# Patient Record
Sex: FEMALE | Race: WHITE
[De-identification: names, ages, dates, MRNs, and addresses within clinical notes are randomized per-mention and may not be internally consistent; named-entity substitution may affect disease eponyms.]

---

## 2020-09-30 ENCOUNTER — DASHBOARD ENCOUNTERS (OUTPATIENT)
Age: 60
End: 2020-09-30

## 2020-09-30 ENCOUNTER — OFFICE VISIT (OUTPATIENT)
Dept: URBAN - METROPOLITAN AREA CLINIC 54 | Facility: CLINIC | Age: 60
End: 2020-09-30
Payer: COMMERCIAL

## 2020-09-30 ENCOUNTER — LAB OUTSIDE AN ENCOUNTER (OUTPATIENT)
Dept: URBAN - METROPOLITAN AREA CLINIC 54 | Facility: CLINIC | Age: 60
End: 2020-09-30

## 2020-09-30 DIAGNOSIS — R74.8 ELEVATED ALKALINE PHOSPHATASE LEVEL: ICD-10-CM

## 2020-09-30 DIAGNOSIS — R19.5 POSITIVE FECAL OCCULT BLOOD TEST: ICD-10-CM

## 2020-09-30 DIAGNOSIS — R10.11 RUQ PAIN: ICD-10-CM

## 2020-09-30 DIAGNOSIS — K83.8 DILATION OF BILIARY TRACT: ICD-10-CM

## 2020-09-30 PROBLEM — 105997008: Status: ACTIVE | Noted: 2020-09-30

## 2020-09-30 PROCEDURE — 82977 ASSAY OF GGT: CPT | Performed by: INTERNAL MEDICINE

## 2020-09-30 PROCEDURE — 1036F TOBACCO NON-USER: CPT | Performed by: INTERNAL MEDICINE

## 2020-09-30 PROCEDURE — 3017F COLORECTAL CA SCREEN DOC REV: CPT | Performed by: INTERNAL MEDICINE

## 2020-09-30 PROCEDURE — 99213 OFFICE O/P EST LOW 20 MIN: CPT | Performed by: INTERNAL MEDICINE

## 2020-09-30 PROCEDURE — G8427 DOCREV CUR MEDS BY ELIG CLIN: HCPCS | Performed by: INTERNAL MEDICINE

## 2020-09-30 PROCEDURE — 43235 EGD DIAGNOSTIC BRUSH WASH: CPT | Performed by: INTERNAL MEDICINE

## 2020-09-30 RX ORDER — ESOMEPRAZOLE MAGNESIUM 40 MG/1
1 CAPSULE CAPSULE, DELAYED RELEASE ORAL ONCE A DAY
Status: ACTIVE | COMMUNITY

## 2020-09-30 RX ORDER — METFORMIN HYDROCHLORIDE 500 MG/1
TABLET, COATED ORAL
Qty: 0 | Refills: 0 | Status: DISCONTINUED | COMMUNITY
Start: 1900-01-01

## 2020-09-30 RX ORDER — LISINOPRIL 40 MG/1
TABLET ORAL
Qty: 0 | Refills: 0 | Status: ACTIVE | COMMUNITY
Start: 1900-01-01

## 2020-09-30 RX ORDER — FAMOTIDINE 20 MG/1
TABLET ORAL
Qty: 0 | Refills: 0 | Status: ACTIVE | COMMUNITY
Start: 1900-01-01

## 2020-09-30 RX ORDER — NORTRIPTYLINE HYDROCHLORIDE 10 MG/1
CAPSULE ORAL
Qty: 0 | Refills: 0 | Status: ACTIVE | COMMUNITY
Start: 1900-01-01

## 2020-09-30 RX ORDER — CETIRIZINE HYDROCHLORIDE 10 MG/1
TABLET, FILM COATED ORAL
Qty: 0 | Refills: 0 | Status: ACTIVE | COMMUNITY
Start: 1900-01-01

## 2020-09-30 RX ORDER — FLUTICASONE PROPIONATE 50 UG/1
SPRAY, METERED NASAL
Qty: 0 | Refills: 0 | Status: ACTIVE | COMMUNITY
Start: 1900-01-01

## 2020-09-30 RX ORDER — GABAPENTIN 600 MG/1
1 TABLET TABLET, FILM COATED ORAL ONCE A DAY
Status: ACTIVE | COMMUNITY

## 2020-09-30 RX ORDER — CITALOPRAM 40 MG/1
TABLET ORAL
Qty: 0 | Refills: 0 | Status: ACTIVE | COMMUNITY
Start: 1900-01-01

## 2020-09-30 RX ORDER — SODIUM, POTASSIUM,MAG SULFATES 17.5-3.13G
354ML SOLUTION, RECONSTITUTED, ORAL ORAL
Qty: 354 MILLILITER | Refills: 0 | OUTPATIENT

## 2020-09-30 NOTE — HPI-TODAY'S VISIT:
This is a 60-year-old lady with a history of hypertension, diabetes, status post cholecystectomy, and a history of tubulovillous polyp presents as a follow-up to clinic for a positive fecal occult blood test. Patient reports that for the last 3 to 4 months she has had some right-sided periumbilical discomfort that is tender to palpation.  It does not appear to be positional or associated with food or bowel movements.  Is a constant discomfort.  Her bowel movements are once every 2 days of formed stool with no visible blood or black tarry stool.  Her primary care doctor ordered a fecal occult blood test that was positive recently.  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  She had a recent CT abdomen with contrast that showed a fatty liver normal pancreas and a surgically absent gallbladder.  However the bile duct was noted to be dilated.  She had labs that showed an elevated alk phos of 146 with normal enzymes and bilirubin.  Last CBC showed WBC of 10, hemoglobin 15.2, platelets at 236.  BUN of 26 and creatinine is 0.75. She denies any alcohol, tobacco, or drug use.  She denies any family history of colon cancer.

## 2020-10-05 ENCOUNTER — TELEPHONE ENCOUNTER (OUTPATIENT)
Dept: URBAN - METROPOLITAN AREA CLINIC 54 | Facility: CLINIC | Age: 60
End: 2020-10-05

## 2020-10-08 ENCOUNTER — CLAIMS CREATED FROM THE CLAIM WINDOW (OUTPATIENT)
Dept: URBAN - METROPOLITAN AREA CLINIC 4 | Facility: CLINIC | Age: 60
End: 2020-10-08
Payer: COMMERCIAL

## 2020-10-08 ENCOUNTER — OFFICE VISIT (OUTPATIENT)
Dept: URBAN - METROPOLITAN AREA SURGERY CENTER 14 | Facility: SURGERY CENTER | Age: 60
End: 2020-10-08
Payer: COMMERCIAL

## 2020-10-08 DIAGNOSIS — K31.7 BENIGN GASTRIC POLYP: ICD-10-CM

## 2020-10-08 DIAGNOSIS — D12.4 ADENOMA OF DESCENDING COLON: ICD-10-CM

## 2020-10-08 DIAGNOSIS — K62.5 ANAL BLEEDING: ICD-10-CM

## 2020-10-08 DIAGNOSIS — D12.4 BENIGN NEOPLASM OF DESCENDING COLON: ICD-10-CM

## 2020-10-08 DIAGNOSIS — R10.84 ABDOMINAL CRAMPING, GENERALIZED: ICD-10-CM

## 2020-10-08 DIAGNOSIS — K31.7 POLYP OF STOMACH AND DUODENUM: ICD-10-CM

## 2020-10-08 DIAGNOSIS — T47.8X5A ADVERSE EFFECT OF OTHER AGENTS PRIMARILY AFFECTING GASTROINTESTINAL SYSTEM, INITIAL ENCOUNTER: ICD-10-CM

## 2020-10-08 PROCEDURE — 45385 COLONOSCOPY W/LESION REMOVAL: CPT | Performed by: INTERNAL MEDICINE

## 2020-10-08 PROCEDURE — 88312 SPECIAL STAINS GROUP 1: CPT | Performed by: PATHOLOGY

## 2020-10-08 PROCEDURE — G9937 DIG OR SURV COLSCO: HCPCS | Performed by: INTERNAL MEDICINE

## 2020-10-08 PROCEDURE — 43239 EGD BIOPSY SINGLE/MULTIPLE: CPT | Performed by: INTERNAL MEDICINE

## 2020-10-08 PROCEDURE — G8907 PT DOC NO EVENTS ON DISCHARG: HCPCS | Performed by: INTERNAL MEDICINE

## 2020-10-08 PROCEDURE — 88305 TISSUE EXAM BY PATHOLOGIST: CPT | Performed by: PATHOLOGY

## 2020-10-08 RX ORDER — NORTRIPTYLINE HYDROCHLORIDE 10 MG/1
CAPSULE ORAL
Qty: 0 | Refills: 0 | Status: ACTIVE | COMMUNITY
Start: 1900-01-01

## 2020-10-08 RX ORDER — ESOMEPRAZOLE MAGNESIUM 40 MG/1
1 CAPSULE CAPSULE, DELAYED RELEASE ORAL ONCE A DAY
Status: ACTIVE | COMMUNITY

## 2020-10-08 RX ORDER — SODIUM, POTASSIUM,MAG SULFATES 17.5-3.13G
354ML SOLUTION, RECONSTITUTED, ORAL ORAL
Qty: 354 MILLILITER | Refills: 0 | Status: ACTIVE | COMMUNITY

## 2020-10-08 RX ORDER — CITALOPRAM 40 MG/1
TABLET ORAL
Qty: 0 | Refills: 0 | Status: ACTIVE | COMMUNITY
Start: 1900-01-01

## 2020-10-08 RX ORDER — GABAPENTIN 600 MG/1
1 TABLET TABLET, FILM COATED ORAL ONCE A DAY
Status: ACTIVE | COMMUNITY

## 2020-10-08 RX ORDER — FAMOTIDINE 20 MG/1
TABLET ORAL
Qty: 0 | Refills: 0 | Status: ACTIVE | COMMUNITY
Start: 1900-01-01

## 2020-10-08 RX ORDER — CETIRIZINE HYDROCHLORIDE 10 MG/1
TABLET, FILM COATED ORAL
Qty: 0 | Refills: 0 | Status: ACTIVE | COMMUNITY
Start: 1900-01-01

## 2020-10-08 RX ORDER — FLUTICASONE PROPIONATE 50 UG/1
SPRAY, METERED NASAL
Qty: 0 | Refills: 0 | Status: ACTIVE | COMMUNITY
Start: 1900-01-01

## 2020-10-08 RX ORDER — LISINOPRIL 40 MG/1
TABLET ORAL
Qty: 0 | Refills: 0 | Status: ACTIVE | COMMUNITY
Start: 1900-01-01

## 2020-10-16 ENCOUNTER — TELEPHONE ENCOUNTER (OUTPATIENT)
Dept: URBAN - METROPOLITAN AREA CLINIC 54 | Facility: CLINIC | Age: 60
End: 2020-10-16

## 2021-08-23 LAB
A/G RATIO: 1.8
ALBUMIN: 4.8
ALKALINE PHOSPHATASE: 133
ALT (SGPT): 62
AST (SGOT): 44
BILIRUBIN, TOTAL: 0.5
BUN/CREATININE RATIO: 28
BUN: 23
CALCIUM: 9.9
CARBON DIOXIDE, TOTAL: 22
CHLORIDE: 99
CREATININE: 0.83
EGFR IF AFRICN AM: 90
EGFR IF NONAFRICN AM: 78
GGT: 69
GLOBULIN, TOTAL: 2.6
GLUCOSE: 136
HEMATOCRIT: 42
HEMOGLOBIN: 14.3
MCH: 32.6
MCHC: 34
MCV: 96
NRBC: (no result)
PLATELETS: 250
POTASSIUM: 4
PROTEIN, TOTAL: 7.4
RBC: 4.38
RDW: 13
SODIUM: 139
WBC: 7.2

## 2025-06-16 ENCOUNTER — OFFICE VISIT (OUTPATIENT)
Dept: URBAN - NONMETROPOLITAN AREA CLINIC 4 | Facility: CLINIC | Age: 65
End: 2025-06-16

## 2025-07-02 ENCOUNTER — OFFICE VISIT (OUTPATIENT)
Dept: URBAN - NONMETROPOLITAN AREA CLINIC 4 | Facility: CLINIC | Age: 65
End: 2025-07-02

## 2025-07-21 ENCOUNTER — OFFICE VISIT (OUTPATIENT)
Dept: URBAN - NONMETROPOLITAN AREA CLINIC 4 | Facility: CLINIC | Age: 65
End: 2025-07-21
Payer: COMMERCIAL

## 2025-07-21 ENCOUNTER — LAB OUTSIDE AN ENCOUNTER (OUTPATIENT)
Dept: URBAN - NONMETROPOLITAN AREA CLINIC 4 | Facility: CLINIC | Age: 65
End: 2025-07-21

## 2025-07-21 ENCOUNTER — TELEPHONE ENCOUNTER (OUTPATIENT)
Dept: URBAN - NONMETROPOLITAN AREA CLINIC 4 | Facility: CLINIC | Age: 65
End: 2025-07-21

## 2025-07-21 DIAGNOSIS — K58.0 IRRITABLE BOWEL SYNDROME WITH DIARRHEA: ICD-10-CM

## 2025-07-21 DIAGNOSIS — D12.6 TUBULAR ADENOMA OF COLON: ICD-10-CM

## 2025-07-21 DIAGNOSIS — Z83.719 FAMILY HISTORY OF COLONIC POLYPS: ICD-10-CM

## 2025-07-21 PROBLEM — 197125005: Status: ACTIVE | Noted: 2025-07-21

## 2025-07-21 PROCEDURE — 99203 OFFICE O/P NEW LOW 30 MIN: CPT | Performed by: REGISTERED NURSE

## 2025-07-21 RX ORDER — CITALOPRAM 40 MG/1
TABLET ORAL
Qty: 0 | Refills: 0 | Status: ACTIVE | COMMUNITY
Start: 1900-01-01

## 2025-07-21 RX ORDER — GABAPENTIN 600 MG/1
1 TABLET TABLET, FILM COATED ORAL ONCE A DAY
Status: DISCONTINUED | COMMUNITY

## 2025-07-21 RX ORDER — OMEPRAZOLE 40 MG/1
1 CAPSULE 1/2 TO 1 HOUR BEFORE MORNING MEAL CAPSULE, DELAYED RELEASE ORAL ONCE A DAY
Qty: 30 | Refills: 1 | OUTPATIENT
Start: 2025-07-21

## 2025-07-21 RX ORDER — NORTRIPTYLINE HYDROCHLORIDE 10 MG/1
CAPSULE ORAL
Qty: 0 | Refills: 0 | Status: ACTIVE | COMMUNITY
Start: 1900-01-01

## 2025-07-21 RX ORDER — ESOMEPRAZOLE MAGNESIUM 40 MG/1
1 CAPSULE CAPSULE, DELAYED RELEASE ORAL ONCE A DAY
Status: DISCONTINUED | COMMUNITY

## 2025-07-21 RX ORDER — CETIRIZINE HYDROCHLORIDE 10 MG/1
TABLET, FILM COATED ORAL
Qty: 0 | Refills: 0 | Status: DISCONTINUED | COMMUNITY
Start: 1900-01-01

## 2025-07-21 RX ORDER — EZETIMIBE 10 MG/1
1 TABLET TABLET ORAL ONCE A DAY
Status: ACTIVE | COMMUNITY

## 2025-07-21 RX ORDER — SODIUM, POTASSIUM,MAG SULFATES 17.5-3.13G
354ML SOLUTION, RECONSTITUTED, ORAL ORAL
Qty: 354 MILLILITER | Refills: 0 | Status: DISCONTINUED | COMMUNITY

## 2025-07-21 RX ORDER — LISINOPRIL 20 MG/1
1 TABLET TABLET ORAL ONCE A DAY
Refills: 0 | Status: ACTIVE | COMMUNITY
Start: 1900-01-01

## 2025-07-21 RX ORDER — FAMOTIDINE 20 MG/1
TABLET ORAL
Qty: 0 | Refills: 0 | Status: DISCONTINUED | COMMUNITY
Start: 1900-01-01

## 2025-07-21 RX ORDER — FLUTICASONE PROPIONATE 50 UG/1
SPRAY, METERED NASAL
Qty: 0 | Refills: 0 | Status: ACTIVE | COMMUNITY
Start: 1900-01-01

## 2025-07-21 NOTE — HPI-TODAY'S VISIT:
7/21/25: Pt is a 64 yo female with PMH of colon polyps, HTN, HLD, DM2, asthma, GERD, IBS who presents for colonoscopy.  Last colonoscopy 10/8/20: - Hemorrhoids found on perianal exam - likely source of bleeding. - One 7 mm polyp in the descending colon, removed with a cold snare. Resected and retrieved. - Diverticulosis in the entire examined colon. Bx c/w TA polyp  She reports darker and looser stools for past 6 weeks. Has associated bloating and gas. Has Hx of IBS. Feels more stressed. Taking Imodium and Pepto Bismol as needed. There is no family history of colon cancer. Siblings have colon polyps.   Patient is not on anticoagulants/antiplatelets, have a pacemaker/defibrillator, cardiac arrythmia or heart failure. The patient has not had a recent (< 3 months) MI, CVA, TIA, CAD/stents. Patient gaines not have cardiac valvulopathy, history of anesthesia complications including high fever with anesthesia. The patient is not on dialysis and does not have a seizure disorder, need for continuous home O2, severe pulmonary disease, or need for assistance with walking or transferring from surface-to-surface. The patient does not have a BMI of 50 or above.

## 2025-07-31 ENCOUNTER — OFFICE VISIT (OUTPATIENT)
Dept: URBAN - NONMETROPOLITAN AREA CLINIC 4 | Facility: CLINIC | Age: 65
End: 2025-07-31